# Patient Record
Sex: MALE | Race: WHITE | NOT HISPANIC OR LATINO | Employment: FULL TIME | URBAN - METROPOLITAN AREA
[De-identification: names, ages, dates, MRNs, and addresses within clinical notes are randomized per-mention and may not be internally consistent; named-entity substitution may affect disease eponyms.]

---

## 2017-01-12 ENCOUNTER — COMMUNICATION - HEALTHEAST (OUTPATIENT)
Dept: TELEHEALTH | Facility: CLINIC | Age: 47
End: 2017-01-12

## 2017-01-12 ENCOUNTER — OFFICE VISIT - HEALTHEAST (OUTPATIENT)
Dept: FAMILY MEDICINE | Facility: CLINIC | Age: 47
End: 2017-01-12

## 2017-01-12 DIAGNOSIS — G47.33 OSA (OBSTRUCTIVE SLEEP APNEA): ICD-10-CM

## 2017-01-12 DIAGNOSIS — E66.9 OBESITY, UNSPECIFIED: ICD-10-CM

## 2017-01-12 DIAGNOSIS — Z02.4 ENCOUNTER FOR COMMERCIAL DRIVER MEDICAL EXAMINATION (CDME): ICD-10-CM

## 2017-01-12 DIAGNOSIS — I10 ESSENTIAL HYPERTENSION: ICD-10-CM

## 2017-01-12 DIAGNOSIS — R31.9 HEMATURIA, UNSPECIFIED: ICD-10-CM

## 2017-01-12 ASSESSMENT — MIFFLIN-ST. JEOR: SCORE: 2348.84

## 2017-03-27 ENCOUNTER — OFFICE VISIT - HEALTHEAST (OUTPATIENT)
Dept: FAMILY MEDICINE | Facility: CLINIC | Age: 47
End: 2017-03-27

## 2017-03-27 DIAGNOSIS — Z02.4 ENCOUNTER FOR COMMERCIAL DRIVER MEDICAL EXAMINATION (CDME): ICD-10-CM

## 2017-03-27 ASSESSMENT — MIFFLIN-ST. JEOR: SCORE: 2322.76

## 2018-03-26 ENCOUNTER — OFFICE VISIT - HEALTHEAST (OUTPATIENT)
Dept: FAMILY MEDICINE | Facility: CLINIC | Age: 48
End: 2018-03-26

## 2018-03-26 ENCOUNTER — COMMUNICATION - HEALTHEAST (OUTPATIENT)
Dept: TELEHEALTH | Facility: CLINIC | Age: 48
End: 2018-03-26

## 2018-03-26 DIAGNOSIS — I10 ESSENTIAL HYPERTENSION: ICD-10-CM

## 2018-03-26 DIAGNOSIS — R31.9 HEMATURIA: ICD-10-CM

## 2018-03-26 DIAGNOSIS — Z02.4 ENCOUNTER FOR COMMERCIAL DRIVER MEDICAL EXAMINATION (CDME): ICD-10-CM

## 2018-03-26 LAB
ALBUMIN UR-MCNC: NEGATIVE MG/DL
APPEARANCE UR: CLEAR
BACTERIA #/AREA URNS HPF: ABNORMAL HPF
BILIRUB UR QL STRIP: NEGATIVE
COLOR UR AUTO: YELLOW
GLUCOSE UR STRIP-MCNC: NEGATIVE MG/DL
HGB UR QL STRIP: ABNORMAL
KETONES UR STRIP-MCNC: NEGATIVE MG/DL
LEUKOCYTE ESTERASE UR QL STRIP: NEGATIVE
NITRATE UR QL: NEGATIVE
PH UR STRIP: 6.5 [PH] (ref 5–8)
RBC #/AREA URNS AUTO: ABNORMAL HPF
SP GR UR STRIP: 1.02 (ref 1–1.03)
SQUAMOUS #/AREA URNS AUTO: ABNORMAL LPF
UROBILINOGEN UR STRIP-ACNC: ABNORMAL
WBC #/AREA URNS AUTO: ABNORMAL HPF

## 2018-03-26 ASSESSMENT — MIFFLIN-ST. JEOR: SCORE: 2354.51

## 2019-04-01 ENCOUNTER — OFFICE VISIT - HEALTHEAST (OUTPATIENT)
Dept: FAMILY MEDICINE | Facility: CLINIC | Age: 49
End: 2019-04-01

## 2019-04-01 ENCOUNTER — COMMUNICATION - HEALTHEAST (OUTPATIENT)
Dept: TELEHEALTH | Facility: CLINIC | Age: 49
End: 2019-04-01

## 2019-04-01 ENCOUNTER — HOSPITAL ENCOUNTER (OUTPATIENT)
Dept: CT IMAGING | Facility: CLINIC | Age: 49
Discharge: HOME | End: 2019-04-01
Attending: FAMILY MEDICINE

## 2019-04-01 DIAGNOSIS — K43.9 VENTRAL HERNIA WITHOUT OBSTRUCTION OR GANGRENE: ICD-10-CM

## 2019-04-01 DIAGNOSIS — I10 ESSENTIAL HYPERTENSION: ICD-10-CM

## 2019-04-02 ENCOUNTER — AMBULATORY - HEALTHEAST (OUTPATIENT)
Dept: FAMILY MEDICINE | Facility: CLINIC | Age: 49
End: 2019-04-02

## 2019-04-02 DIAGNOSIS — K43.9 VENTRAL HERNIA WITHOUT OBSTRUCTION OR GANGRENE: ICD-10-CM

## 2019-04-15 ENCOUNTER — OFFICE VISIT - HEALTHEAST (OUTPATIENT)
Dept: SURGERY | Facility: CLINIC | Age: 49
End: 2019-04-15

## 2019-04-15 DIAGNOSIS — E66.01 MORBID OBESITY (H): ICD-10-CM

## 2019-04-15 DIAGNOSIS — K43.9 VENTRAL HERNIA WITHOUT OBSTRUCTION OR GANGRENE: ICD-10-CM

## 2019-04-15 ASSESSMENT — MIFFLIN-ST. JEOR: SCORE: 2354.51

## 2019-05-02 ENCOUNTER — OFFICE VISIT - HEALTHEAST (OUTPATIENT)
Dept: FAMILY MEDICINE | Facility: CLINIC | Age: 49
End: 2019-05-02

## 2019-05-02 ENCOUNTER — COMMUNICATION - HEALTHEAST (OUTPATIENT)
Dept: TELEHEALTH | Facility: CLINIC | Age: 49
End: 2019-05-02

## 2019-05-02 DIAGNOSIS — K43.9 VENTRAL HERNIA WITHOUT OBSTRUCTION OR GANGRENE: ICD-10-CM

## 2019-05-02 DIAGNOSIS — I10 ESSENTIAL HYPERTENSION: ICD-10-CM

## 2019-05-02 DIAGNOSIS — G47.33 OSA ON CPAP: ICD-10-CM

## 2019-05-02 DIAGNOSIS — Z01.818 PREOP GENERAL PHYSICAL EXAM: ICD-10-CM

## 2019-05-02 LAB
ANION GAP SERPL CALCULATED.3IONS-SCNC: 8 MMOL/L (ref 5–18)
ATRIAL RATE - MUSE: 60 BPM
BUN SERPL-MCNC: 16 MG/DL (ref 8–22)
CALCIUM SERPL-MCNC: 10.2 MG/DL (ref 8.5–10.5)
CHLORIDE BLD-SCNC: 104 MMOL/L (ref 98–107)
CO2 SERPL-SCNC: 29 MMOL/L (ref 22–31)
CREAT SERPL-MCNC: 0.83 MG/DL (ref 0.7–1.3)
DIASTOLIC BLOOD PRESSURE - MUSE: NORMAL MMHG
GFR SERPL CREATININE-BSD FRML MDRD: >60 ML/MIN/1.73M2
GLUCOSE BLD-MCNC: 87 MG/DL (ref 70–125)
HGB BLD-MCNC: 16.7 G/DL (ref 14–18)
INTERPRETATION ECG - MUSE: NORMAL
P AXIS - MUSE: 25 DEGREES
POTASSIUM BLD-SCNC: 4 MMOL/L (ref 3.5–5)
PR INTERVAL - MUSE: 162 MS
QRS DURATION - MUSE: 108 MS
QT - MUSE: 410 MS
QTC - MUSE: 410 MS
R AXIS - MUSE: 43 DEGREES
SODIUM SERPL-SCNC: 141 MMOL/L (ref 136–145)
SYSTOLIC BLOOD PRESSURE - MUSE: NORMAL MMHG
T AXIS - MUSE: 2 DEGREES
VENTRICULAR RATE- MUSE: 60 BPM

## 2019-05-02 ASSESSMENT — MIFFLIN-ST. JEOR: SCORE: 2368.12

## 2019-05-06 ENCOUNTER — COMMUNICATION - HEALTHEAST (OUTPATIENT)
Dept: FAMILY MEDICINE | Facility: CLINIC | Age: 49
End: 2019-05-06

## 2019-05-15 ASSESSMENT — MIFFLIN-ST. JEOR: SCORE: 2368.12

## 2019-05-16 ENCOUNTER — ANESTHESIA - HEALTHEAST (OUTPATIENT)
Dept: SURGERY | Facility: HOSPITAL | Age: 49
End: 2019-05-16

## 2019-05-17 ENCOUNTER — SURGERY - HEALTHEAST (OUTPATIENT)
Dept: SURGERY | Facility: HOSPITAL | Age: 49
End: 2019-05-17

## 2019-05-17 ASSESSMENT — MIFFLIN-ST. JEOR: SCORE: 2335.01

## 2019-05-30 ENCOUNTER — OFFICE VISIT - HEALTHEAST (OUTPATIENT)
Dept: SURGERY | Facility: CLINIC | Age: 49
End: 2019-05-30

## 2019-05-30 DIAGNOSIS — Z48.89 POSTOPERATIVE VISIT: ICD-10-CM

## 2021-05-27 NOTE — PROGRESS NOTES
ASSESSMENT/PLAN:  Abdominal pain  Suspect Ventral hernia without obstruction or gangrene  -     CT Abdomen Pelvis With Oral With IV Contrast; Future  OTC analgesics for pain  Further management will depend on CT findings    Essential hypertension  Blood pressures slightly elevated today.  We will continue with lisinopril 40 mg and hydrochlorothiazide 25 mg.  No change in medication except counseled on healthy lifestyle modifications.  We will follow-up in 1 month.    SUBJECTIVE:    Erik Caro is a 48 y.o. male who came in today     Abdominal pain  The patient has known ventral hernia above the umbilicus.  Over the last 6 months he has noted worsening pain above the umbilicus, around the umbilicus, and right lower quadrant area.  The pain started when he was lifting heavy weights.  And now is aggravated by weightlifting and physical exertion.  He has stopped lifting weights but has still noticed intermittent abdominal pain.  He takes over-the-counter analgesics at times for the pain.  No radiation of the pain.  No associated bowel movements changes.  No associated nausea or vomiting.    Hypertension  Currently takes hydrochlorothiazide 25 mg and lisinopril 40 mg.  No chest pain or shortness of breath.  Blood pressure is elevated today    Review of Systems (except those mentioned above)  Constitutional: Negative.   HENT: Negative.   Eyes: Negative.   Respiratory: Negative.   Cardiovascular: Negative.   Gastrointestinal: Negative.   Endocrine: Negative.   Genitourinary: Negative.   Musculoskeletal: Negative.   Skin: Negative.   Allergic/Immunologic: Negative.   Neurological: Negative.   Hematological: Negative.   Psychiatric/Behavioral: Negative.     Patient Active Problem List    Diagnosis Date Noted     MEL on CPAP 03/26/2018     Obesity      Hypertension      Hematuria      Joint Pain, Localized In The Left Shoulder      Joint Pain, Localized In The Knee      No Known Allergies  Current Outpatient Medications    Medication Sig Dispense Refill     hydroCHLOROthiazide (HYDRODIURIL) 25 MG tablet        lisinopril (PRINIVIL,ZESTRIL) 40 MG tablet        No current facility-administered medications for this visit.      No past medical history on file.  Past Surgical History:   Procedure Laterality Date     KNEE SURGERY Right 08/2016    replaced ACL     [place] REMOVAL OF HEEL SPUR      Description: Ostectomy Calcaneus For Spur;  Recorded: 03/14/2014;     Social History     Socioeconomic History     Marital status:      Spouse name: None     Number of children: None     Years of education: None     Highest education level: None   Occupational History     None   Social Needs     Financial resource strain: None     Food insecurity:     Worry: None     Inability: None     Transportation needs:     Medical: None     Non-medical: None   Tobacco Use     Smoking status: Never Smoker     Smokeless tobacco: Never Used   Substance and Sexual Activity     Alcohol use: None     Drug use: None     Sexual activity: None   Lifestyle     Physical activity:     Days per week: None     Minutes per session: None     Stress: None   Relationships     Social connections:     Talks on phone: None     Gets together: None     Attends Rastafarian service: None     Active member of club or organization: None     Attends meetings of clubs or organizations: None     Relationship status: None     Intimate partner violence:     Fear of current or ex partner: None     Emotionally abused: None     Physically abused: None     Forced sexual activity: None   Other Topics Concern     None   Social History Narrative     None     Family History   Problem Relation Age of Onset     Hypertension Mother      Hypertension Father          OBJECTIVE:    Vitals:    04/01/19 1447 04/01/19 1517   BP: (!) 144/92 144/80   Patient Site: Left Arm Left Arm   Patient Position: Sitting Sitting   Cuff Size: Adult Regular Thigh   Pulse: 80    Weight: (!) 323 lb 5 oz (146.7 kg)       Body mass index is 43.85 kg/m .    Physical Exam:  Constitutional: Patient was oriented to person, place, and time. Patient appeared well-developed and well-nourished. No distress.   Head: Normocephalic and atraumatic.   Right Ear: External ear normal.   Left Ear: External ear normal.   Nose: Nose normal.   Eyes: Conjunctivae and EOM were normal. Right eye exhibited no discharge. Left eye exhibited no discharge. No scleral icterus.   Cardiovascular: Normal rate, regular rhythm, normal heart sounds and intact distal pulses. No murmur heard.   Pulmonary/Chest: Effort normal and breath sounds normal. No stridor. No respiratory distress. Patient had no wheezes, no rales, exhibits no tenderness.   Abdominal: Soft.  Ventral protrusion of the abdomen.  There was no tenderness. There was no rebound and no guarding.     Results for orders placed or performed in visit on 03/26/18   Urinalysis-UC if Indicated   Result Value Ref Range    Color, UA Yellow Colorless, Yellow, Straw, Light Yellow    Clarity, UA Clear Clear    Glucose, UA Negative Negative    Bilirubin, UA Negative Negative    Ketones, UA Negative Negative    Specific Gravity, UA 1.020 1.005 - 1.030    Blood, UA Small (!) Negative    pH, UA 6.5 5.0 - 8.0    Protein, UA Negative Negative mg/dL    Urobilinogen, UA 0.2 E.U./dL 0.2 E.U./dL, 1.0 E.U./dL    Nitrite, UA Negative Negative    Leukocytes, UA Negative Negative    Bacteria, UA None Seen None Seen hpf    RBC, UA 3-5 (!) None Seen, 0-2 hpf    WBC, UA None Seen None Seen, 0-5 hpf    Squam Epithel, UA 0-5 None Seen, 0-5 lpf

## 2021-05-27 NOTE — PROGRESS NOTES
GENERAL SURGICAL CONSULTATION    I was requested by Judy Patel, Alyse Arreguin MD to consult on this pt to evaluate them for ventral hernias    HPI:  This is a 49 y.o. male here today with both a supraumbilical ventral hernia and an upper midline ventral hernia.  This patient struggles with his weight but has not done a lot to start losing weight up to this point.  He is also quite physical at his work and does weight lifting for physical activity.  He is noted to swellings along his abdominal wall wound just above his umbilicus and one just a bit below the xiphoid process.  These of been growing over the last year.  He has had no nausea or vomiting but he does get some bruising at these areas from time to time.    Allergies:Patient has no known allergies.    Past Medical History:   Diagnosis Date     Hypertension      Sleep apnea     Uses CPAP       Past Surgical History:   Procedure Laterality Date     KNEE SURGERY Right 08/2016    replaced ACL     [place] REMOVAL OF HEEL SPUR      Description: Ostectomy Calcaneus For Spur;  Recorded: 03/14/2014;       CURRENT MEDS:  Current Outpatient Medications   Medication Sig Dispense Refill     hydroCHLOROthiazide (HYDRODIURIL) 25 MG tablet        lisinopril (PRINIVIL,ZESTRIL) 40 MG tablet        No current facility-administered medications for this visit.        Family History   Problem Relation Age of Onset     Hypertension Mother      Hypertension Father      Family history is not pertinent to this patients Chief Complaint.     reports that he has never smoked. He has never used smokeless tobacco.    Review of Systems -   10 point Review of systems is negative except for; as mentioned above in HPI and PMHx    /90 (Patient Site: Right Arm, Patient Position: Sitting, Cuff Size: Adult Large)   Pulse 75   Ht 6' (1.829 m)   Wt (!) 320 lb (145.2 kg)   SpO2 96%   BMI 43.40 kg/m    Body mass index is 43.4 kg/m .    EXAM:  GENERAL: Obese male  HEENT: EOMI, Anicteric  Sclera, Moist Mucous Membranes,  In Mouth the pt does not have redness or bleeding gums  CARDIOVASCULAR: RRR w/out murmur   CHEST/LUNG: Clear to Auscultation  ABDOMEN:  Non tender to palpation, +BS, 2 lesions I can feel pushing up into the subcutaneous tissues along the midline between the umbilicus and the xiphoid process.  These correspond with the ventral hernias I can see on the patient's CT scan.  MUSCULOSKELETAL:  No deformities with good range of motion in all extremities  NEURO: He is ambulatory with good strength in both legs.  HEME/LYMPH: No Cervical Adenopathy or tenderness.     IMAGES:  EXAM: CT ABDOMEN PELVIS W ORAL W IV CONTRAST  LOCATION: [facility]  DATE/TIME: 4/1/2019 5:08 PM     INDICATION: Abdominal pain, ventral hernia without obstruction or gangrene.  COMPARISON: None.  TECHNIQUE: Helical enhanced thin-section CT scan of the abdomen and pelvis was performed following injection of IV contrast. Multiplanar reformats were obtained. Dose reduction techniques were used.  CONTRAST: Iohexol (Omni) 100 mL.     FINDINGS:   LUNG BASES: Moderate fibroatelectasis.     ABDOMEN: Hepatic steatosis. Right renal cyst with kidneys otherwise unremarkable. Spleen, adrenal glands, pancreas, and gallbladder unremarkable. No aortic aneurysm. No adenopathy. Upper anterior abdominal wall hernia with fascial defect of approximately   7 mm with fat within it noted on image #47. No bowel obstruction. Supraumbilical anterior abdominal wall hernia with fascial defect of approximately 2.3 cm on image #90. Small fat-containing paraumbilical hernia. No aortic aneurysm. No adenopathy.     PELVIS: Diverticulosis without diverticulitis. Normal appendix. Nonspecific calcifications within a normal-sized prostate gland.     MUSCULOSKELETAL: Degenerative disease. Potential nerve root impingement.     IMPRESSION:   CONCLUSION:   1.  Anterior abdominal wall and paraumbilical fat-containing hernias without bowel obstruction.  2.   Hepatic steatosis.  3.  Diverticulosis without diverticulitis    Assessment/Plan:  49-year-old gentleman that has 2 ventral hernias are both small but growing.  The issue leading into this however is his excessive BMI at 43.  I discussed weight losing options with him but he is not comfortable or interested in any surgical procedures at this time.  He is interested in doing a weight management program that is medically managed.  Given that as his start I would recommend we go ahead and repair this ventral hernias and I will get him set up with weight loss program.    Robotic ventral hernia repair    Consultation with medical weight management program through our clinic.      Jas Fischer MD  [facility]  [phone]

## 2021-05-28 NOTE — ANESTHESIA CARE TRANSFER NOTE
Last vitals:   Vitals:    05/17/19 1620   BP: 142/83   Pulse: (!) 104   Resp: 14   Temp: 36.3  C (97.3  F)   SpO2: 95%     Patient's level of consciousness is drowsy  Spontaneous respirations: yes  Maintains airway independently: yes  Dentition unchanged: yes  Oropharynx: oropharynx clear of all foreign objects    QCDR Measures:  ASA# 20 - Surgical Safety Checklist: WHO surgical safety checklist completed prior to induction    PQRS# 430 - Adult PONV Prevention: 4558F - Pt received => 2 anti-emetic agents (different classes) preop & intraop  ASA# 8 - Peds PONV Prevention: NA - Not pediatric patient, not GA or 2 or more risk factors NOT present  PQRS# 424 - [place]yatta-op Temp Management: 4559F - At least one body temp DOCUMENTED => 35.5C or 95.9F within required timeframe  PQRS# 426 - PACU Transfer Protocol: - Transfer of care checklist used  ASA# 14 - Acute Post-op Pain: ASA14B - Patient did NOT experience pain >= 7 out of 10

## 2021-05-28 NOTE — ANESTHESIA POSTPROCEDURE EVALUATION
Patient: Erik Caro  ROBOTIC VENTRAL HERNIA REPAIR  AND EPIGASTRIC HERNIA REPAIR  Anesthesia type: general    Patient location: PACU  Last vitals:   Vitals Value Taken Time   /77 5/17/2019  5:50 PM   Temp 36.3  C (97.3  F) 5/17/2019  4:20 PM   Pulse 99 5/17/2019  5:51 PM   Resp 16 5/17/2019  5:50 PM   SpO2 96 % 5/17/2019  5:51 PM   Vitals shown include unvalidated device data.  Post vital signs: stable  Level of consciousness: awake and responds to simple questions  Post-anesthesia pain: pain controlled  Post-anesthesia nausea and vomiting: no  Pulmonary: unassisted, return to baseline  Cardiovascular: stable and blood pressure at baseline  Hydration: adequate  Anesthetic events: no    QCDR Measures:  ASA# 11 - [place]atta-op Cardiac Arrest: ASA11B - Patient did NOT experience unanticipated cardiac arrest  ASA# 12 - [place]atta-op Mortality Rate: ASA12B - Patient did NOT die  ASA# 13 - PACU Re-Intubation Rate: ASA13B - Patient did NOT require a new airway mgmt  ASA# 10 - Composite Anes Safety: ASA10A - No serious adverse event    Additional Notes:

## 2021-05-28 NOTE — PROGRESS NOTES
Preoperative Exam    Scheduled Procedure: ROBOTIC VENTRAL HERNIA REPAIR X 2  Surgery Date:  5/17/19  Surgery Location: [facility], fax [phone]    Surgeon:  Jas Fischer MD    Assessment/Plan:     1.  Preop general physical exam, Ventral hernia without obstruction or gangrene  -     Electrocardiogram Perform - Clinic  -     Hemoglobin  -     Basic Metabolic Panel    Surgical Procedure Risk: Intermediate (reported cardiac risk generally 1-5%)  Have you had prior anesthesia?: Yes  Have you or any family members had a previous anesthesia reaction:  No  Do you or any family members have a history of a clotting or bleeding disorder?: No  Cardiac Risk Assessment: no increased risk for major cardiac complications    Patient approved for surgery with general or local anesthesia.    Please Note:  Patient uses a CPAP machine.    Functional Status: Independent  Patient plans to recover at home with family.     2.  Hypertension  Stable on hydrochlorothiazide and lisinopril  No change in medication in preparation for surgery     3.  MEL on CPAP  No concerns at this time    4.  BMI 40.0-44.9, adult (H)  Counseled healthy lifestyle modifications    Subjective:      Erik Caro is a 49 y.o. male who presents for a preoperative consultation.      All other systems reviewed and are negative, other than those listed in the HPI.    Pertinent History  Do you have difficulty breathing or chest pain after walking up a flight of stairs: No  History of obstructive sleep apnea: Yes: uses CPAP  Steroid use in the last 6 months: No  Frequent Aspirin/NSAID use: Yes: aleve  Prior Blood Transfusion: No  Prior Blood Transfusion Reaction: No  If for some reason prior to, during or after the procedure, if it is medically indicated, would you be willing to have a blood transfusion?:  There is no transfusion refusal.    Current Outpatient Medications   Medication Sig Dispense Refill     hydroCHLOROthiazide (HYDRODIURIL) 25 MG  tablet        lisinopril (PRINIVIL,ZESTRIL) 40 MG tablet        No current facility-administered medications for this visit.         No Known Allergies    Patient Active Problem List   Diagnosis     Obesity     Hypertension     Joint Pain, Localized In The Left Shoulder     Joint Pain, Localized In The Knee     MEL on CPAP       Past Medical History:   Diagnosis Date     Hypertension      Sleep apnea     Uses CPAP       Past Surgical History:   Procedure Laterality Date     KNEE SURGERY Right 08/2016    replaced ACL     [place] REMOVAL OF HEEL SPUR      Description: Ostectomy Calcaneus For Spur;  Recorded: 03/14/2014;       Social History     Socioeconomic History     Marital status:      Spouse name: Not on file     Number of children: Not on file     Years of education: Not on file     Highest education level: Not on file   Occupational History     Not on file   Social Needs     Financial resource strain: Not on file     Food insecurity:     Worry: Not on file     Inability: Not on file     Transportation needs:     Medical: Not on file     Non-medical: Not on file   Tobacco Use     Smoking status: Never Smoker     Smokeless tobacco: Never Used   Substance and Sexual Activity     Alcohol use: Not on file     Drug use: Not on file     Sexual activity: Not on file   Lifestyle     Physical activity:     Days per week: Not on file     Minutes per session: Not on file     Stress: Not on file   Relationships     Social connections:     Talks on phone: Not on file     Gets together: Not on file     Attends [employer] service: Not on file     Active member of club or organization: Not on file     Attends meetings of clubs or organizations: Not on file     Relationship status: Not on file     Intimate partner violence:     Fear of current or ex partner: Not on file     Emotionally abused: Not on file     Physically abused: Not on file     Forced sexual activity: Not on file   Other Topics Concern     Not on file   Social  History Narrative     Not on file       Patient Care Team:  Alyse Nassar MD as PCP - General (Family Medicine)          Objective:     Vitals:    05/02/19 1521   BP: 134/68   Pulse: 63   SpO2: 97%   Weight: (!) 323 lb (146.5 kg)   Height: 6' (1.829 m)         Physical Exam:    Objective:    Physical Exam   Vitals:    05/02/19 1521   BP: 134/68   Pulse: 63   SpO2: 97%      Constitutional: Patient is oriented to person, place, and time. Patient appears well-developed and well-nourished. No distress.   Head: Normocephalic and atraumatic.   Right Ear: External ear normal. Normal TM  Left Ear: External ear normal. Normal TM  Nose: Nose normal.   Mouth/Throat: Oropharynx is clear and moist. No oropharyngeal exudate.   Eyes: Conjunctivae and EOM are normal. Pupils are equal, round, and reactive to light. Right eye exhibits no discharge. Left eye exhibits no discharge. No scleral icterus.   Neck: Neck supple. No JVD present. No tracheal deviation present. No thyromegaly present.   Cardiovascular: Normal rate, regular rhythm, normal heart sounds and intact distal pulses. No murmur heard.   Pulmonary/Chest: Effort normal and breath sounds normal. No stridor. No respiratory distress. Patient has no wheezes, no rales, exhibits no tenderness.   Abdominal: Soft. Bowel sounds are normal. Patient exhibits no distension and no mass. There is no tenderness. There is no rebound and no guarding.   Lymphadenopathy:  Patient has no cervical adenopathy.   Neurological: Patient is alert and oriented to person, place, and time. Patient has normal reflexes. No cranial nerve deficit. Coordination normal.   Skin: Skin is warm and dry. No rash noted. Patient is not diaphoretic. No erythema. No pallor.     Results for orders placed or performed in visit on 05/02/19   Hemoglobin   Result Value Ref Range    Hemoglobin 16.7 14.0 - 18.0 g/dL   Electrocardiogram Perform - Clinic   Result Value Ref Range    SYSTOLIC BLOOD PRESSURE   mmHg    DIASTOLIC BLOOD PRESSURE  mmHg    VENTRICULAR RATE 60 BPM    ATRIAL RATE 60 BPM    P-R INTERVAL 162 ms    QRS DURATION 108 ms    Q-T INTERVAL 410 ms    QTC CALCULATION (BEZET) 410 ms    P Axis 25 degrees    R AXIS 43 degrees    T AXIS 2 degrees    MUSE DIAGNOSIS       Normal sinus rhythm  Nonspecific T wave abnormality  Abnormal ECG  No previous ECGs available  Confirmed by BRANDON WOO MD LOC: ([id number]) on 5/2/2019 4:28:12 PM          Electronically signed by Alyse Patel MD 05/02/19 3:04 PM

## 2021-05-28 NOTE — ANESTHESIA PREPROCEDURE EVALUATION
Anesthesia Evaluation      Patient summary reviewed     Airway   Mallampati: III  Neck ROM: full   Pulmonary - normal exam    breath sounds clear to auscultation                         Cardiovascular   Rhythm: regular  Rate: normal,         Neuro/Psych - negative ROS     Endo/Other    (+) obesity,      GI/Hepatic/Renal - negative ROS      Other findings: EKG-NSR      Dental - normal exam                        Anesthesia Plan  Planned anesthetic: general endotracheal  Tap block if requested, ketamine  ASA 3   Induction: intravenous   Anesthetic plan and risks discussed with: patient  Anesthesia plan special considerations: video-assisted,   Post-op plan: routine recovery

## 2021-05-30 VITALS — BODY MASS INDEX: 42.39 KG/M2 | WEIGHT: 313 LBS | HEIGHT: 72 IN

## 2021-05-30 VITALS — WEIGHT: 315 LBS | BODY MASS INDEX: 41.75 KG/M2 | HEIGHT: 73 IN

## 2021-06-01 VITALS — BODY MASS INDEX: 42.66 KG/M2 | HEIGHT: 72 IN | WEIGHT: 315 LBS

## 2021-06-02 VITALS — BODY MASS INDEX: 42.66 KG/M2 | WEIGHT: 315 LBS | HEIGHT: 72 IN

## 2021-06-02 VITALS — BODY MASS INDEX: 43.85 KG/M2 | WEIGHT: 315 LBS

## 2021-06-03 VITALS — HEIGHT: 73 IN | BODY MASS INDEX: 41.38 KG/M2 | WEIGHT: 312.2 LBS

## 2021-06-03 VITALS — BODY MASS INDEX: 41.56 KG/M2 | WEIGHT: 315 LBS

## 2021-06-03 VITALS — WEIGHT: 315 LBS | HEIGHT: 72 IN | BODY MASS INDEX: 42.66 KG/M2

## 2021-06-08 NOTE — PROGRESS NOTES
ASSESSMENT/PLAN:  1. Encounter for  medical examination (CDME)  Certified for 3 months due to elevated blood pressure.  I advised increase in lisinopril to 30mg and a recheck at his PCP.  - Urinalysis-UC if Indicated  - Vision Screening  - Color Vision Examination  - Hearing Screening    2. Essential hypertension  Increase lisinopril to 30mg.  lifestyle modification and weight reduction advised.  Without lengthy discussion regarding what pressure and complication of uncontrolled blood pressure  He will follow-up with his primary care provider for blood pressure check.  I recommend checking a BMP with his PCP    3. MEL (obstructive sleep apnea)  Stable on CPAP    4. Obesity  Lifestyle modification counseled.  It appears that the patient eats less than 3111-8930 kimmie daily.  I recommend that he get his diabetes and thyroid checked with his PCP    5. Blood In Urine  Known history with negative workup.  According to the patient he had a negative prostate examination and negative colonoscopy.  He does not have any active urinary symptoms.  I will defer this to PCP      Orders Placed This Encounter   Procedures     Vision Screening     Color Vision Examination     Hearing Screening     Urinalysis-UC if Indicated           CHIEF COMPLAINT:  Chief Complaint   Patient presents with     's License Exam     DOT       HISTORY OF PRESENT ILLNESS:  Erik is a 46 y.o. male presenting to the clinic today for his 's exam. 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 He does not wear corrective lenses, and had Lasik done years ago. His hearing and vision are stable.     Hypertension: His blood pressure is elevated today at 160/100. He does take 20mg of lisinopril for his blood pressure. He has been on this medication for a while. He notes that he just exercised before coming, so this could be why his blood pressure is  "elevated. There is a family history of hypertension.     REVIEW OF SYSTEMS:   Denies any dysuria, polyuria, hematuria, or discolored urine. Recent knee surgery, knee is doing well, still having some stiffness. Sleep apnea stable with cpap use. Denies sore throat, abdominal pain, hernias, leg swelling, joint problems. All other systems are negative.    PFSH:  No family history of bladder or prostate problems.    TOBACCO USE:  History   Smoking Status     Not on file   Smokeless Tobacco     Not on file       VITALS:  Vitals:    01/12/17 0931 01/12/17 1016   BP: (!) 160/100 (!) 154/102   Patient Site: Left Arm    Patient Position: Sitting    Cuff Size: Adult Large    Pulse: 72    Weight: (!) 317 lb (143.8 kg)    Height: 6' 0.5\" (1.842 m)      Wt Readings from Last 3 Encounters:   01/12/17 (!) 317 lb (143.8 kg)       PHYSICAL EXAM:  Constitutional: Patient is oriented to person, place, and time. Patient appears well-developed and well-nourished. No distress.   Head: Normocephalic and atraumatic.   Right Ear: External ear normal. Ear canal and TM normal.   Left Ear: External ear normal. Ear canal and TM normal.   Nose: Nose normal.   Mouth/Throat:  No oropharyngeal exudate. Slight peritonsillar erythema.   Eyes: Conjunctivae and EOM are normal. Pupils are equal, round, and reactive to light. Right eye exhibits no discharge. Left eye exhibits no discharge. No scleral icterus.   Neck: Neck supple. No JVD present. No tracheal deviation present. No thyromegaly present.   Cardiovascular: Normal rate, regular rhythm, normal heart sounds and intact distal pulses. No murmur heard.   Pulmonary/Chest: Effort normal and breath sounds normal. No stridor. No respiratory distress. Patient has no wheezes, no rales, exhibits no tenderness.   Abdominal: Soft. Bowel sounds are normal. Patient exhibits no distension and no mass. There is no tenderness. There is no rebound and no guarding.   Lymphadenopathy:  Patient has no cervical " adenopathy.   Neurological: Patient is alert and oriented to person, place, and time. Patient has normal reflexes. No cranial nerve deficit. Coordination normal.   Skin: Skin is warm and dry. No rash noted. Patient is not diaphoretic. No erythema. No pallor.    QUALITY MEASURES:  The following high BMI interventions were performed this visit: encouragement to exercise and lifestyle education regarding diet    ADDITIONAL HISTORY SUMMARIZED (2): None.  DECISION TO OBTAIN EXTRA INFORMATION (1): None.   RADIOLOGY TESTS (1): None.  LABS (1): Ordered and reviewed labs.  MEDICINE TESTS (1): None.  INDEPENDENT REVIEW (2 each): None.     The visit lasted a total of 40 minutes face to face with the patient. Over 50% of the time was spent counseling and educating the patient about his 's exam.    IMady, am scribing for and in the presence of, Dr. Kim.    IDr. Kim, personally performed the services described in this documentation, as scribed by Mady Rolle in my presence, and it is both accurate and complete.    MEDICATIONS:  Current Outpatient Prescriptions   Medication Sig Dispense Refill     lisinopril (PRINIVIL,ZESTRIL) 20 MG tablet Take 1 tab daily       No current facility-administered medications for this visit.        Total data points: 1

## 2021-06-09 NOTE — PROGRESS NOTES
ASSESSMENT/PLAN:  1. Encounter for  medical examination (CDME)  He meets standard but with periodic monitoring due to hypertension.  He will increase his lisinopril hydrochlorothiazide to that 40 per 50 mg.  Continue with low sodium restriction.  Reduce caloric intake from 3202 to 3000 calories per day with the focus on lean meat and veggies.  I also recommended a focus on toning and more cardiovascular versus building muscle mass.  He has been certified for 1 year with certificate expiration date of March 27, 2018.  - Vision Screening  - Color Vision Examination  - Hearing Screening  - Urinalysis-UC if Indicated    2.  HTN  He will increase his lisinopril hydrochlorothiazide to that 40 per 50 mg.  Follow-up in 2 weeks for nurse only blood pressure check.  Continue with low sodium restriction.  Reduce caloric intake from 3202 to 3000 calories per day with the focus on lean meat and veggies.  I also recommended a focus on toning and more cardiovascular versus building muscle mass.    3.  Obstructive sleep apnea  Continue with CPAP use    Orders Placed This Encounter   Procedures     Vision Screening     Color Vision Examination     Hearing Screening     Urinalysis-UC if Indicated           CHIEF COMPLAINT:  Chief Complaint   Patient presents with     's License Exam     DOT       HISTORY OF PRESENT ILLNESS:  Erik is a 46 y.o. male presenting to the clinic today for his 's exam. This is a renewal. His hearing and vision are stable. He does have known blood in his urine. His blood pressure today is elevated at 158/82. He was previously taking lisinopril, and saw his PCP in January, and was switched to lisinopril-hydrochlorothiazide. He says that he had his blood pressure taken last week, and it was within range at 125/82.     REVIEW OF SYSTEMS:   Constitutional: Negative.   HENT: Negative.   Eyes: Negative.   Respiratory: Negative.   Cardiovascular: Negative.    Gastrointestinal: Negative.   Endocrine: Negative.   Genitourinary: Negative.   Musculoskeletal: Negative.   Skin: Negative.   Allergic/Immunologic: Negative.   Neurological: Negative.   Hematological: Negative.   Psychiatric/Behavioral: Negative.   All other systems are negative.    PFSH:  He exercises regularly, and is eating a high protein diet.     TOBACCO USE:  History   Smoking Status     Not on file   Smokeless Tobacco     Not on file       VITALS:  Vitals:    03/27/17 0935 03/27/17 1018   BP: 158/82 (!) 148/96   Patient Site: Left Arm Left Arm   Patient Position: Sitting Sitting   Cuff Size: Adult Large Adult Large   Pulse: 64    Weight: (!) 313 lb (142 kg)    Height: 6' (1.829 m)      Wt Readings from Last 3 Encounters:   03/27/17 (!) 313 lb (142 kg)   01/12/17 (!) 317 lb (143.8 kg)       PHYSICAL EXAM:  Constitutional: Patient is oriented to person, place, and time. Patient appears well-developed and well-nourished. No distress.   Head: Normocephalic and atraumatic.   Right Ear: External ear normal. Ear canal and TM normal.   Left Ear: External ear normal. Ear canal and TM normal.   Nose: Nose normal.   Mouth/Throat: Oropharynx is clear and moist. No oropharyngeal exudate.   Eyes: Conjunctivae and EOM are normal. Pupils are equal, round, and reactive to light. Right eye exhibits no discharge. Left eye exhibits no discharge. No scleral icterus.   Neck: Neck supple. No JVD present. No tracheal deviation present. No thyromegaly present.   Cardiovascular: Normal rate, regular rhythm, normal heart sounds and intact distal pulses. No murmur heard.   Pulmonary/Chest: Effort normal and breath sounds normal. No stridor. No respiratory distress. Patient has no wheezes, no rales, exhibits no tenderness.   Abdominal: Soft. Bowel sounds are normal. Patient exhibits no distension and no mass. There is no tenderness. There is no rebound and no guarding.   Neurological: Patient is alert and oriented to person, place,  and time. Patient has normal reflexes. No cranial nerve deficit. Coordination normal.   Skin: Skin is warm and dry. No rash noted. Patient is not diaphoretic. No erythema. No pallor.    Results for orders placed or performed in visit on 03/27/17   Urinalysis-UC if Indicated   Result Value Ref Range    Color, UA Yellow Colorless, Yellow, Straw, Light Yellow    Clarity, UA Clear Clear    Glucose, UA Negative Negative    Bilirubin, UA Negative Negative    Ketones, UA Negative Negative    Specific Gravity, UA 1.015 1.005 - 1.030    Blood, UA Small (!) Negative    pH, UA 6.5 5.0 - 8.0    Protein, UA Negative Negative mg/dL    Urobilinogen, UA 0.2 E.U./dL 0.2 E.U./dL, 1.0 E.U./dL    Nitrite, UA Negative Negative    Leukocytes, UA Negative Negative    Bacteria, UA None Seen None Seen hpf    RBC, UA 0-2 None Seen, 0-2 hpf    WBC, UA None Seen None Seen, 0-5 hpf    Squam Epithel, UA 0-5 None Seen, 0-5 lpf       QUALITY MEASURES:  The following high BMI interventions were performed this visit: encouragement to exercise and lifestyle education regarding diet    ADDITIONAL HISTORY SUMMARIZED (2): None.  DECISION TO OBTAIN EXTRA INFORMATION (1): None.   RADIOLOGY TESTS (1): None.  LABS (1): Ordered labs today.  MEDICINE TESTS (1): None.  INDEPENDENT REVIEW (2 each): None.     The visit lasted a total of 28 minutes face to face with the patient. Over 50% of the time was spent counseling and educating the patient about his 's exam and hypertension.    IMady, am scribing for and in the presence of, Dr. Kim.    Dr. Judy LOREDO, personally performed the services described in this documentation, as scribed by Mady Rolle in my presence, and it is both accurate and complete.    MEDICATIONS:  Current Outpatient Prescriptions   Medication Sig Dispense Refill     lisinopril-hydrochlorothiazide (PRINZIDE,ZESTORETIC) 20-25 mg per tablet Take one tab daily       No current facility-administered  medications for this visit.        Total data points: 1

## 2021-06-13 ENCOUNTER — HEALTH MAINTENANCE LETTER (OUTPATIENT)
Age: 51
End: 2021-06-13

## 2021-06-16 PROBLEM — G47.33 OSA ON CPAP: Status: ACTIVE | Noted: 2018-03-26

## 2021-06-16 NOTE — PROGRESS NOTES
ASSESSMENT/PLAN:  Encounter for  medical examination (CDME)  He meets standard but with periodic monitoring due to hypertension.  If BP continues to be stable, may consider increasing his certificate interval to 2 years.  Continue with low sodium restriction. He has been certified for 1 year with certificate expiration date of March 26, 2019.  -     Vision Screening  -     Color Vision Examination  -     Urinalysis-UC if Indicated  -     Hearing Screening    Hypertension  Good control    Hematuria  stable    MEL on CPAP  stable    Orders Placed This Encounter   Procedures     Vision Screening     Color Vision Examination     Hearing Screening     Urinalysis-UC if Indicated       CHIEF COMPLAINT:  Chief Complaint   Patient presents with     's License Exam     DOT       HISTORY OF PRESENT ILLNESS:  Erik is a 47 y.o. male presenting to the clinic today for his 's examination. This is a renewal. He last renewal was for one year. His blood pressure and pulse are within normal limits. His hearing and vision are stable. He does NOT wear corrective lenses. He has known hematuria. He continues to use his c-pap. Denies joint problems. No numbness or weakness of his extremities.     Hypertension: His blood pressure is stable today at 138/84. He is currently taking lisinopril 40mg and hydrochlorothiazide 25mg daily for his blood pressure. Denies chest pain or shortness of breath.     REVIEW OF SYSTEMS:   Denies fevers or chills. Bowel and bladder functions are stable. No concerns for hernias. All other systems are negative.    PFSH:  Occasional alcohol use. Exercises regularly; lifts weights.   Past medical history of bone fracture in right hand years ago.     TOBACCO USE:  History   Smoking Status     Never Smoker   Smokeless Tobacco     Never Used       VITALS:  Vitals:    03/26/18 1106   BP: 138/84   Pulse: 76   Weight: (!) 320 lb (145.2 kg)   Height: 6' (1.829 m)      Wt Readings from Last 3 Encounters:   03/26/18 (!) 320 lb (145.2 kg)   03/27/17 (!) 313 lb (142 kg)   01/12/17 (!) 317 lb (143.8 kg)       PHYSICAL EXAM:  Constitutional: Patient is oriented to person, place, and time. Patient appears well-developed and well-nourished. No distress.   Head: Normocephalic and atraumatic.   Right Ear: External ear normal. Ear canal and TM normal.   Left Ear: External ear normal. Ear canal and TM normal.   Nose: Nose normal.   Mouth/Throat: Oropharynx is clear and moist. No oropharyngeal exudate.   Eyes: Conjunctivae and EOM are normal. Pupils are equal, round, and reactive to light. Right eye exhibits no discharge. Left eye exhibits no discharge. No scleral icterus.   Cardiovascular: Normal rate, regular rhythm, normal heart sounds and intact distal pulses. No murmur heard.   Pulmonary/Chest: Effort normal and breath sounds normal. No stridor. No respiratory distress. Patient has no wheezes, no rales, exhibits no tenderness.   Abdominal: Soft. Bowel sounds are normal. Patient exhibits no distension and no mass. There is no tenderness. There is no rebound and no guarding.   Neurological: Patient is alert and oriented to person, place, and time. Patient has normal reflexes. No cranial nerve deficit. Coordination normal.   Skin: Skin is warm and dry. No rash noted. Patient is not diaphoretic. No erythema. No pallor.  Musculoskeletal: Full range of motion of shoulders. Motor strength intact.     Results for orders placed or performed in visit on 03/26/18   Urinalysis-UC if Indicated   Result Value Ref Range    Color, UA Yellow Colorless, Yellow, Straw, Light Yellow    Clarity, UA Clear Clear    Glucose, UA Negative Negative    Bilirubin, UA Negative Negative    Ketones, UA Negative Negative    Specific Gravity, UA 1.020 1.005 - 1.030    Blood, UA Small (!) Negative    pH, UA 6.5 5.0 - 8.0    Protein, UA Negative Negative mg/dL    Urobilinogen, UA 0.2 E.U./dL 0.2 E.U./dL, 1.0 E.U./dL     Nitrite, UA Negative Negative    Leukocytes, UA Negative Negative         ADDITIONAL HISTORY SUMMARIZED (2): None.  DECISION TO OBTAIN EXTRA INFORMATION (1): None.   RADIOLOGY TESTS (1): None.  LABS (1): Ordered and reviewed labs today.  MEDICINE TESTS (1): None.  INDEPENDENT REVIEW (2 each): None.     I, Mady Rolle, am scribing for and in the presence of, Dr. Kim.    IAlyse MD , personally performed the services described in this documentation, as scribed by Mady Rolle in my presence, and it is both accurate and complete.    MEDICATIONS:  Current Outpatient Prescriptions   Medication Sig Dispense Refill     hydroCHLOROthiazide (HYDRODIURIL) 25 MG tablet        lisinopril (PRINIVIL,ZESTRIL) 40 MG tablet        No current facility-administered medications for this visit.        Total data points: 1

## 2021-06-19 NOTE — LETTER
Letter by Alyse Nassar MD at      Author: Alyse Nassar MD Service: -- Author Type: --    Filed:  Encounter Date: 5/6/2019 Status: (Other)         Erik Caro  [id number]  [facility] [id number]             May 6, 2019         Dear Mr. Caro,    Below are the results from your recent visit:    Resulted Orders   Hemoglobin   Result Value Ref Range    Hemoglobin 16.7 14.0 - 18.0 g/dL   Basic Metabolic Panel   Result Value Ref Range    Sodium 141 136 - 145 mmol/L    Potassium 4.0 3.5 - 5.0 mmol/L    Chloride 104 98 - 107 mmol/L    CO2 29 22 - 31 mmol/L    Anion Gap, Calculation 8 5 - 18 mmol/L    Glucose 87 70 - 125 mg/dL    Calcium 10.2 8.5 - 10.5 mg/dL    BUN 16 8 - 22 mg/dL    Creatinine 0.83 0.70 - 1.30 mg/dL    GFR MDRD Af Amer >60 >60 mL/min/1.73m2    GFR MDRD Non Af Amer >60 >60 mL/min/1.73m2    Narrative    Fasting Glucose reference range is 70-99 mg/dL per  American Diabetes Association (ADA) guidelines.       Electrolytes, glucose, kidney function are all normal.       Please call with questions or contact us using [employer].    Sincerely,        Electronically signed by Alyse Patel MD

## 2021-10-03 ENCOUNTER — HEALTH MAINTENANCE LETTER (OUTPATIENT)
Age: 51
End: 2021-10-03

## 2022-07-10 ENCOUNTER — HEALTH MAINTENANCE LETTER (OUTPATIENT)
Age: 52
End: 2022-07-10

## 2022-09-10 ENCOUNTER — HEALTH MAINTENANCE LETTER (OUTPATIENT)
Age: 52
End: 2022-09-10

## 2022-12-20 NOTE — PROGRESS NOTES
HPI: Pt is here for follow up robotic ventral hernia repair with Dr. Albarado on 5/17/2019.   he is doing well.  Pain is well controlled.  No difficulties with the surgical wound/wounds.  he is eating well and denies fever and chills.         /72 (Patient Site: Right Arm, Patient Position: Sitting, Cuff Size: Adult Large)   Pulse 73   Temp 98.8  F (37.1  C) (Tympanic)   Wt (!) 315 lb (142.9 kg)   SpO2 96%   BMI 41.56 kg/m      EXAM:  GENERAL:Appears well  ABDOMEN:  Soft, +BS  SURGICAL WOUNDS:  Incisions healing well, no enduration or drainage.      Assessment/Plan: Doing well after surgery and should follow up as needed.    Leila Lundy , [facility]        Patient is to take antibiotic as prescribed and until completed.  Increase fluids.   No work/school until 24 hours on antibiotic.   Change toothbrush after 24 hours on antibiotic.  May use salt water gargles and drink warm tea with honey if age appropriate.   Educated on proper hand hygiene and respiratory etiquette.  May take OTC age appropriate cold medication, Tylenol or ibuprofen for symptom relief.   May use humidifier.   If symptoms do not improve patient is to return to clinic.     Go to ER if difficulty breathing because your throat is so swollen, trouble swallowing, drooling, cannot open mouth.     Verbalized Understanding: Patient verbalized understanding and agrees with plan  All questions answered.

## 2023-07-23 ENCOUNTER — HEALTH MAINTENANCE LETTER (OUTPATIENT)
Age: 53
End: 2023-07-23